# Patient Record
Sex: FEMALE | Race: WHITE | Employment: UNEMPLOYED | URBAN - METROPOLITAN AREA
[De-identification: names, ages, dates, MRNs, and addresses within clinical notes are randomized per-mention and may not be internally consistent; named-entity substitution may affect disease eponyms.]

---

## 2024-06-02 ENCOUNTER — APPOINTMENT (OUTPATIENT)
Dept: CT IMAGING | Facility: HOSPITAL | Age: 49
End: 2024-06-02
Attending: EMERGENCY MEDICINE

## 2024-06-02 ENCOUNTER — HOSPITAL ENCOUNTER (EMERGENCY)
Facility: HOSPITAL | Age: 49
Discharge: HOME | End: 2024-06-02
Attending: EMERGENCY MEDICINE

## 2024-06-02 VITALS
SYSTOLIC BLOOD PRESSURE: 149 MMHG | HEART RATE: 92 BPM | RESPIRATION RATE: 17 BRPM | DIASTOLIC BLOOD PRESSURE: 82 MMHG | OXYGEN SATURATION: 98 % | WEIGHT: 160 LBS | HEIGHT: 64 IN | BODY MASS INDEX: 27.31 KG/M2 | TEMPERATURE: 98 F

## 2024-06-02 DIAGNOSIS — D64.9 ANEMIA, UNSPECIFIED TYPE: ICD-10-CM

## 2024-06-02 DIAGNOSIS — K80.20 CALCULUS OF GALLBLADDER WITHOUT CHOLECYSTITIS WITHOUT OBSTRUCTION: Primary | ICD-10-CM

## 2024-06-02 DIAGNOSIS — M54.2 NECK PAIN: ICD-10-CM

## 2024-06-02 LAB
ALBUMIN SERPL-MCNC: 3.7 G/DL (ref 3.4–5)
ALBUMIN/GLOB SERPL: 0.9 {RATIO} (ref 1–2)
ALP LIVER SERPL-CCNC: 75 U/L
ALT SERPL-CCNC: 18 U/L
ANION GAP SERPL CALC-SCNC: 5 MMOL/L (ref 0–18)
AST SERPL-CCNC: 15 U/L (ref 15–37)
BASOPHILS # BLD AUTO: 0.08 X10(3) UL (ref 0–0.2)
BASOPHILS NFR BLD AUTO: 1.3 %
BILIRUB SERPL-MCNC: 0.2 MG/DL (ref 0.1–2)
BUN BLD-MCNC: 9 MG/DL (ref 9–23)
CALCIUM BLD-MCNC: 8.7 MG/DL (ref 8.5–10.1)
CHLORIDE SERPL-SCNC: 108 MMOL/L (ref 98–112)
CO2 SERPL-SCNC: 26 MMOL/L (ref 21–32)
CREAT BLD-MCNC: 0.67 MG/DL
EGFRCR SERPLBLD CKD-EPI 2021: 108 ML/MIN/1.73M2 (ref 60–?)
EOSINOPHIL # BLD AUTO: 0.12 X10(3) UL (ref 0–0.7)
EOSINOPHIL NFR BLD AUTO: 1.9 %
ERYTHROCYTE [DISTWIDTH] IN BLOOD BY AUTOMATED COUNT: 17.5 %
GLOBULIN PLAS-MCNC: 4.1 G/DL (ref 2.8–4.4)
GLUCOSE BLD-MCNC: 86 MG/DL (ref 70–99)
HCT VFR BLD AUTO: 30.6 %
HGB BLD-MCNC: 9.3 G/DL
IMM GRANULOCYTES # BLD AUTO: 0.02 X10(3) UL (ref 0–1)
IMM GRANULOCYTES NFR BLD: 0.3 %
LIPASE SERPL-CCNC: 40 U/L (ref 13–75)
LYMPHOCYTES # BLD AUTO: 2.65 X10(3) UL (ref 1–4)
LYMPHOCYTES NFR BLD AUTO: 42.3 %
MCH RBC QN AUTO: 20.8 PG (ref 26–34)
MCHC RBC AUTO-ENTMCNC: 30.4 G/DL (ref 31–37)
MCV RBC AUTO: 68.3 FL
MONOCYTES # BLD AUTO: 0.6 X10(3) UL (ref 0.1–1)
MONOCYTES NFR BLD AUTO: 9.6 %
NEUTROPHILS # BLD AUTO: 2.8 X10 (3) UL (ref 1.5–7.7)
NEUTROPHILS # BLD AUTO: 2.8 X10(3) UL (ref 1.5–7.7)
NEUTROPHILS NFR BLD AUTO: 44.6 %
OSMOLALITY SERPL CALC.SUM OF ELEC: 286 MOSM/KG (ref 275–295)
PLATELET # BLD AUTO: 387 10(3)UL (ref 150–450)
POTASSIUM SERPL-SCNC: 3.8 MMOL/L (ref 3.5–5.1)
PROT SERPL-MCNC: 7.8 G/DL (ref 6.4–8.2)
RBC # BLD AUTO: 4.48 X10(6)UL
SODIUM SERPL-SCNC: 139 MMOL/L (ref 136–145)
WBC # BLD AUTO: 6.3 X10(3) UL (ref 4–11)

## 2024-06-02 PROCEDURE — 74177 CT ABD & PELVIS W/CONTRAST: CPT | Performed by: EMERGENCY MEDICINE

## 2024-06-02 PROCEDURE — 36415 COLL VENOUS BLD VENIPUNCTURE: CPT

## 2024-06-02 PROCEDURE — 85025 COMPLETE CBC W/AUTO DIFF WBC: CPT | Performed by: EMERGENCY MEDICINE

## 2024-06-02 PROCEDURE — 83690 ASSAY OF LIPASE: CPT | Performed by: EMERGENCY MEDICINE

## 2024-06-02 PROCEDURE — 80053 COMPREHEN METABOLIC PANEL: CPT | Performed by: EMERGENCY MEDICINE

## 2024-06-02 PROCEDURE — 99285 EMERGENCY DEPT VISIT HI MDM: CPT

## 2024-06-02 PROCEDURE — 99284 EMERGENCY DEPT VISIT MOD MDM: CPT

## 2024-06-02 RX ORDER — ONDANSETRON 2 MG/ML
4 INJECTION INTRAMUSCULAR; INTRAVENOUS ONCE
Status: DISCONTINUED | OUTPATIENT
Start: 2024-06-02 | End: 2024-06-02

## 2024-06-02 RX ORDER — SODIUM CHLORIDE 9 MG/ML
INJECTION, SOLUTION INTRAVENOUS CONTINUOUS
Status: DISCONTINUED | OUTPATIENT
Start: 2024-06-02 | End: 2024-06-02

## 2024-06-02 NOTE — ED QUICK NOTES
PT C/O GENERALIZED SWELLING. SWELLING TO FEET, LEGS, ARMS, HANDS, NECK, AND ABDOMEN. PT STATES FEELS LIKE HER  SCAR IS STRETCHING DUE TO SWELLING. SKIN WARM AND DRY. NO SOB. NO CHEST PAIN. PT STATES DROVE RECENTLY FROM [place] TO [place]. SWELLING HAS BEEN NOTED FOR 1-2 MONTHS.

## 2024-06-03 ENCOUNTER — TELEPHONE (OUTPATIENT)
Dept: CASE MANAGEMENT | Facility: HOSPITAL | Age: 49
End: 2024-06-03

## 2024-06-03 NOTE — CM/SW NOTE
Patient called stating that the follow up listed on her discharge paperwork for GI does not handle the diagnosis of gallstones. Patient given number for the office of the on call general surgeon for the date of her visit, Dr. Hannah Vivar [phone].

## 2024-06-03 NOTE — ED PROVIDER NOTES
Patient Seen in: [facility] Emergency Department      History     Chief Complaint   Patient presents with    Abdomen/Flank Pain    Neck Pain     Stated Complaint: Abd pain, neck pain for 1-2 months    Subjective:   HPI    48-year-old female presents emergency room with chief complaint of abdominal discomfort/bloating.  States over the last month her abdomen has been getting more bloated, states it feels \"uncomfortable \", states she is moving her bowels normally.  Denies vomiting or diarrhea.  Reports she has had a prior gastric sleeve, states there is tenderness along the surgical incision scar.  Patient denies any urinary symptoms.  Denies vaginal bleeding or discharge.  Denies melena or hematochezia.  Denies any fevers or chills.  Patient also reports over the last month she has had intermittent pain to the left side of her neck which will radiate to the shoulder and top of the chest, symptoms occur randomly, do not feel they occur with exertion, when symptoms occur they will last up to 30 minutes at a time, denies associated lightheaded dizzy nausea or diaphoresis, denies any tearing type chest or abdominal pain.  Denies any symptoms at this time to the neck/chest/shoulder.    Objective:   History reviewed. No pertinent past medical history.           Past Surgical History:   Procedure Laterality Date    Gastric bypass,obese<100cm alize-en-y      Hc  section level i                  No pertinent social history.            Review of Systems    Positive for stated complaint: Abd pain, neck pain for 1-2 months  Other systems are as noted in HPI.  Constitutional and vital signs reviewed.      All other systems reviewed and negative except as noted above.    Physical Exam     ED Triage Vitals [24 1829]   /82   Pulse 92   Resp 17   Temp 97.5 °F (36.4 °C)   Temp src Temporal   SpO2 98 %   O2 Device None (Room air)       Current Vitals:   Vital Signs  BP: 149/82  Pulse: 92  Resp: 17  Temp: 97.5  °F (36.4 °C)  Temp src: Temporal    Oxygen Therapy  SpO2: 98 %  O2 Device: None (Room air)            Physical Exam    GENERAL: Patient is awake, alert, well-appearing, in no acute distress.  HEENT: no scleral icterus.  Mucous membranes are moist, oropharynx is clear  NECK: Cervical spine tenderness.  No tenderness to bilateral trapezius.  Neck is supple, there is no nuchal rigidity.  No carotid bruits.  No masses.  Trachea midline.  No cervical lymphadenopathy.  HEART: Regular rate and rhythm, no murmurs.  LUNGS: Clear to auscultation bilaterally.  No Rales, no rhonchi, no wheezing, no stridor.  ABDOMEN: Soft, nondistended, mild diffuse mid abdominal tender, bowel sounds are present, no rebound, no rigidity, no guarding.no pulsatile masses. No CVA tenderness  EXTREMITIES: Tenderness to the bilateral clavicles, shoulder upper arm elbow forearm wrist hand.  No peripheral edema, no calf tenderness  NEUROLOGIC EXAM: Tongue midline, no facial drooping, no ptosis    ED Course     Labs Reviewed   COMP METABOLIC PANEL (14) - Abnormal; Notable for the following components:       Result Value    A/G Ratio 0.9 (*)     All other components within normal limits   CBC W/ DIFFERENTIAL - Abnormal; Notable for the following components:    HGB 9.3 (*)     HCT 30.6 (*)     MCV 68.3 (*)     MCH 20.8 (*)     MCHC 30.4 (*)     All other components within normal limits   LIPASE - Normal   CBC WITH DIFFERENTIAL WITH PLATELET    Narrative:     The following orders were created for panel order CBC With Differential With Platelet.  Procedure                               Abnormality         Status                     ---------                               -----------         ------                     CBC W/ DIFFERENTIAL[[id number]]          Abnormal            Final result                 Please view results for these tests on the individual orders.   URINALYSIS WITH CULTURE REFLEX   RAINBOW DRAW LAVENDER   RAINBOW DRAW LIGHT GREEN    RAINBOW DRAW BLUE                      MDM        Differential diagnosis before testing includes but not limited to bowel obstruction, pancreatitis, cholelithiasis/cholecystitis, which is a medical condition that poses a threat to life/function    Past Medical History/comorbidities-status post gastric bypass    Radiographic images  I personally reviewed the radiographs and my individual interpretation shows CT abdomen, no free air  I also reviewed the official reports that showed cyst, gastric postsurgical changes.  No free fluid.  No pelvic adenopathy.      Course of Events during Emergency Room Visit include blood work obtained.  CBC white count 6.3 hemoglobin 9.3 platelet 387.  Lipase 40.  Chemistry was unremarkable.  Lipase 40.  CT of the abdomen performed, quite phthisis noted, no other acute findings.  I discussed with patient etiology of her symptoms abdominal distention unclear, she does have cholelithiasis, discussed the possibility of this causing some discomfort.  Patient currently denies any discomfort abdomen soft nontender on reevaluation.  Patient also reports intermittent left-sided neck pain which radiates to the left shoulder over the last month currently symptoms free.  Will refer patient to GI and primary care, return to ER if any change or symptoms.  Patient well-appearing agrees plan discharge    Shared decision making was utilized         Discharge  I have discussed with the patient the results of test, differential diagnosis, treatment plan, warning signs and symptoms which should prompt immediate return.  They expressed understanding of these instructions and agrees to the following plan provided.  They were given written discharge instructions and agrees to return for any concerns and voiced understanding and all questions were answered.    Note to patient: The 21st Century Cures Act makes medical notes like these available to patients in the interest of transparency. However, this is a  medical document intended as peer to peer communication. It is written in medical language and may contain abbreviations or verbiage that are unfamiliar. It may appear blunt or direct. Medical documents are intended to carry relevant information, facts as evident, and the clinical opinion of the practitioner.                                            Medical Decision Making      Disposition and Plan     Clinical Impression:  1. Calculus of gallbladder without cholecystitis without obstruction    2. Anemia, unspecified type    3. Neck pain         Disposition:  Discharge  6/2/2024  9:16 pm    Follow-up:  Sandie Sánchez DO  [address] [facility] [address] [phone]    Follow up in 2 day(s)      Michelle Moy DO  [address] [facility] [address]  [facility] [address] [phone]    Follow up in 2 day(s)            Medications Prescribed:  There are no discharge medications for this patient.

## 2024-06-12 ENCOUNTER — OFFICE VISIT (OUTPATIENT)
Facility: LOCATION | Age: 49
End: 2024-06-12

## 2024-06-12 VITALS — TEMPERATURE: 99 F | HEART RATE: 73 BPM | OXYGEN SATURATION: 98 %

## 2024-06-12 DIAGNOSIS — K80.50 BILIARY COLIC: Primary | ICD-10-CM

## 2024-06-12 PROCEDURE — 99205 OFFICE O/P NEW HI 60 MIN: CPT | Performed by: STUDENT IN AN ORGANIZED HEALTH CARE EDUCATION/TRAINING PROGRAM

## 2024-06-12 NOTE — H&P
New Patient Visit Note       Active Problems      No diagnosis found.    Chief Complaint   Chief Complaint   Patient presents with    Establish Care     NP right shoulder pain that radiates down back and into chest for the last 2 months. Severe swelling in the stomach       History of Present Illness   48 year old female who is here for evaluation of cholelithiasis. She presented to the ER with left sided abdominal pain, left chest pain and left neck and shoulder pain that radiates to the left arm. She also reports bloating and abdominal pain that radiates across the upper quadrants. She was evaluated with lab work, EKG and CT scan of the abdomen and pelvis.   She reports having these symptoms for the past 2 months. The pain is most severe in the left shoulder, neck and left arm. She does report some post prandial discomfort in the right upper quadrant that radiates to the left. She also reports nausea at times but denies vomiting.   She has no past medical history. She has surgical history of gastric bypass, c section and abdominoplasty. She has not had a colonoscopy. She denies melena, blood per rectum or unintentional weight loss, constipation or diarrhea.       Allergies  Maria E has No Known Allergies.    Past Medical / Surgical / Social / Family History    The past medical and past surgical history have been reviewed by me today.    History reviewed. No pertinent past medical history.  Past Surgical History:   Procedure Laterality Date    Gastric bypass,obese<100cm alize-en-y      Hc  section level i         The family history and social history have been reviewed by me today.    History reviewed. No pertinent family history.  Social History     Socioeconomic History    Marital status:    Tobacco Use    Smoking status: Never    Smokeless tobacco: Never   Vaping Use    Vaping status: Never Used   Substance and Sexual Activity    Alcohol use: Never    Drug use: Never      No current outpatient  medications on file.      Review of Systems  The Review of Systems has been reviewed by me during today.  Review of Systems    Physical Findings   Pulse 73   Temp 98.5 °F (36.9 °C) (Temporal)   LMP 05/26/2024   SpO2 98%   Physical Exam  Constitutional:       Appearance: Normal appearance.   HENT:      Head: Normocephalic and atraumatic.   Cardiovascular:      Pulses: Normal pulses.   Pulmonary:      Effort: Pulmonary effort is normal.   Abdominal:      General: Abdomen is flat.      Palpations: Abdomen is soft.      Tenderness: There is no abdominal tenderness. There is no guarding or rebound.      Hernia: No hernia is present.      Comments: Right upper quadrant discomfort   Skin:     General: Skin is warm.      Capillary Refill: Capillary refill takes less than 2 seconds.   Neurological:      Mental Status: She is alert and oriented to person, place, and time. Mental status is at baseline.             Assessment/Plan  No diagnosis found.    Maria E ARROYO is a 48 year old female here for evaluation of gallbladder stones. She has left sided shoulder pain and neck pain. This is unlikely to be triggered by gallbladder disease. She does however report right sided abdominal pain at times, with nausea and bloating. These symptoms can be attributed to cholelithiasis. I reviewed her CT scan and her labs. There is a large calculus in the gallbladder infundibulum towards the neck. This can be symptomatic and causing some of her abdominal symptoms.   Of note she is also anemic. In light of this and her left neck symptoms I would like her to see an internist for further workup of these symptoms and anemia. I also would like her to see GI for evaluation for colonoscopy and EGD to address the anemia. I discussed with her the risks and benefits of gallbladder surgery in detail. She understands and agrees to proceed with planned procedure.        Hannah Vivar MD

## 2024-07-02 RX ORDER — FERROUS SULFATE 325(65) MG
325 TABLET, DELAYED RELEASE (ENTERIC COATED) ORAL
COMMUNITY

## 2024-07-08 ENCOUNTER — OFFICE VISIT (OUTPATIENT)
Dept: FAMILY MEDICINE CLINIC | Facility: CLINIC | Age: 49
End: 2024-07-08
Payer: MEDICAID

## 2024-07-08 ENCOUNTER — TELEPHONE (OUTPATIENT)
Dept: FAMILY MEDICINE CLINIC | Facility: CLINIC | Age: 49
End: 2024-07-08

## 2024-07-08 VITALS
SYSTOLIC BLOOD PRESSURE: 116 MMHG | WEIGHT: 187.38 LBS | BODY MASS INDEX: 32 KG/M2 | TEMPERATURE: 98 F | DIASTOLIC BLOOD PRESSURE: 78 MMHG | OXYGEN SATURATION: 98 % | HEART RATE: 80 BPM

## 2024-07-08 DIAGNOSIS — Z01.818 PRE-OP EXAMINATION: Primary | ICD-10-CM

## 2024-07-08 DIAGNOSIS — K80.20 GALLSTONES: ICD-10-CM

## 2024-07-08 PROCEDURE — 99242 OFF/OP CONSLTJ NEW/EST SF 20: CPT | Performed by: FAMILY MEDICINE

## 2024-07-08 NOTE — TELEPHONE ENCOUNTER
Carolann from [facility] calling. States medical clearance is all that is needed for upcoming procedure.  They already have labs    Dr Rodriguez notified

## 2024-07-19 ENCOUNTER — HOSPITAL ENCOUNTER (OUTPATIENT)
Facility: HOSPITAL | Age: 49
Setting detail: HOSPITAL OUTPATIENT SURGERY
Discharge: HOME | End: 2024-07-19
Attending: STUDENT IN AN ORGANIZED HEALTH CARE EDUCATION/TRAINING PROGRAM | Admitting: STUDENT IN AN ORGANIZED HEALTH CARE EDUCATION/TRAINING PROGRAM
Payer: MEDICAID

## 2024-07-19 ENCOUNTER — ANESTHESIA (OUTPATIENT)
Dept: SURGERY | Facility: HOSPITAL | Age: 49
End: 2024-07-19
Payer: MEDICAID

## 2024-07-19 ENCOUNTER — ANESTHESIA EVENT (OUTPATIENT)
Dept: SURGERY | Facility: HOSPITAL | Age: 49
End: 2024-07-19
Payer: MEDICAID

## 2024-07-19 VITALS
BODY MASS INDEX: 32.1 KG/M2 | SYSTOLIC BLOOD PRESSURE: 121 MMHG | TEMPERATURE: 97 F | HEART RATE: 99 BPM | OXYGEN SATURATION: 97 % | WEIGHT: 188 LBS | DIASTOLIC BLOOD PRESSURE: 64 MMHG | RESPIRATION RATE: 20 BRPM | HEIGHT: 64 IN

## 2024-07-19 DIAGNOSIS — K80.50 BILIARY COLIC: ICD-10-CM

## 2024-07-19 DIAGNOSIS — G89.18 POST-OP PAIN: Primary | ICD-10-CM

## 2024-07-19 LAB
B-HCG UR QL: NEGATIVE
ERYTHROCYTE [DISTWIDTH] IN BLOOD BY AUTOMATED COUNT: 19.4 %
HCT VFR BLD AUTO: 30.1 %
HGB BLD-MCNC: 8.9 G/DL
MCH RBC QN AUTO: 21.2 PG (ref 26–34)
MCHC RBC AUTO-ENTMCNC: 29.6 G/DL (ref 31–37)
MCV RBC AUTO: 71.8 FL
PLATELET # BLD AUTO: 285 10(3)UL (ref 150–450)
RBC # BLD AUTO: 4.19 X10(6)UL
WBC # BLD AUTO: 4.7 X10(3) UL (ref 4–11)

## 2024-07-19 PROCEDURE — BF50200 OTHER IMAGING OF BILE DUCTS USING FLUORESCING AGENT, INDOCYANINE GREEN DYE, INTRAOPERATIVE: ICD-10-PCS | Performed by: STUDENT IN AN ORGANIZED HEALTH CARE EDUCATION/TRAINING PROGRAM

## 2024-07-19 PROCEDURE — 81025 URINE PREGNANCY TEST: CPT

## 2024-07-19 PROCEDURE — 0FT44ZZ RESECTION OF GALLBLADDER, PERCUTANEOUS ENDOSCOPIC APPROACH: ICD-10-PCS | Performed by: STUDENT IN AN ORGANIZED HEALTH CARE EDUCATION/TRAINING PROGRAM

## 2024-07-19 PROCEDURE — 85027 COMPLETE CBC AUTOMATED: CPT

## 2024-07-19 PROCEDURE — 8E0W4CZ ROBOTIC ASSISTED PROCEDURE OF TRUNK REGION, PERCUTANEOUS ENDOSCOPIC APPROACH: ICD-10-PCS | Performed by: STUDENT IN AN ORGANIZED HEALTH CARE EDUCATION/TRAINING PROGRAM

## 2024-07-19 RX ORDER — HYDROMORPHONE HYDROCHLORIDE 1 MG/ML
0.4 INJECTION, SOLUTION INTRAMUSCULAR; INTRAVENOUS; SUBCUTANEOUS EVERY 5 MIN PRN
Status: DISCONTINUED | OUTPATIENT
Start: 2024-07-19 | End: 2024-07-19

## 2024-07-19 RX ORDER — ONDANSETRON 2 MG/ML
INJECTION INTRAMUSCULAR; INTRAVENOUS AS NEEDED
Status: DISCONTINUED | OUTPATIENT
Start: 2024-07-19 | End: 2024-07-19 | Stop reason: SURG

## 2024-07-19 RX ORDER — INDOCYANINE GREEN AND WATER 25 MG
5 KIT INJECTION ONCE
Status: COMPLETED | OUTPATIENT
Start: 2024-07-19 | End: 2024-07-19

## 2024-07-19 RX ORDER — PROCHLORPERAZINE EDISYLATE 5 MG/ML
5 INJECTION INTRAMUSCULAR; INTRAVENOUS EVERY 8 HOURS PRN
Status: DISCONTINUED | OUTPATIENT
Start: 2024-07-19 | End: 2024-07-19

## 2024-07-19 RX ORDER — MIDAZOLAM HYDROCHLORIDE 1 MG/ML
INJECTION INTRAMUSCULAR; INTRAVENOUS AS NEEDED
Status: DISCONTINUED | OUTPATIENT
Start: 2024-07-19 | End: 2024-07-19 | Stop reason: SURG

## 2024-07-19 RX ORDER — BUPIVACAINE HYDROCHLORIDE AND EPINEPHRINE 5; 5 MG/ML; UG/ML
INJECTION, SOLUTION EPIDURAL; INTRACAUDAL; PERINEURAL AS NEEDED
Status: DISCONTINUED | OUTPATIENT
Start: 2024-07-19 | End: 2024-07-19 | Stop reason: HOSPADM

## 2024-07-19 RX ORDER — HYDROMORPHONE HYDROCHLORIDE 1 MG/ML
0.2 INJECTION, SOLUTION INTRAMUSCULAR; INTRAVENOUS; SUBCUTANEOUS EVERY 5 MIN PRN
Status: DISCONTINUED | OUTPATIENT
Start: 2024-07-19 | End: 2024-07-19

## 2024-07-19 RX ORDER — SODIUM CHLORIDE, SODIUM LACTATE, POTASSIUM CHLORIDE, CALCIUM CHLORIDE 600; 310; 30; 20 MG/100ML; MG/100ML; MG/100ML; MG/100ML
INJECTION, SOLUTION INTRAVENOUS CONTINUOUS
Status: DISCONTINUED | OUTPATIENT
Start: 2024-07-19 | End: 2024-07-19

## 2024-07-19 RX ORDER — ROCURONIUM BROMIDE 10 MG/ML
INJECTION, SOLUTION INTRAVENOUS AS NEEDED
Status: DISCONTINUED | OUTPATIENT
Start: 2024-07-19 | End: 2024-07-19 | Stop reason: SURG

## 2024-07-19 RX ORDER — ACETAMINOPHEN 500 MG
1000 TABLET ORAL ONCE
Status: DISCONTINUED | OUTPATIENT
Start: 2024-07-19 | End: 2024-07-19 | Stop reason: HOSPADM

## 2024-07-19 RX ORDER — HYDROMORPHONE HYDROCHLORIDE 1 MG/ML
0.6 INJECTION, SOLUTION INTRAMUSCULAR; INTRAVENOUS; SUBCUTANEOUS EVERY 5 MIN PRN
Status: DISCONTINUED | OUTPATIENT
Start: 2024-07-19 | End: 2024-07-19

## 2024-07-19 RX ORDER — HYDROCODONE BITARTRATE AND ACETAMINOPHEN 5; 325 MG/1; MG/1
2 TABLET ORAL ONCE AS NEEDED
Status: COMPLETED | OUTPATIENT
Start: 2024-07-19 | End: 2024-07-19

## 2024-07-19 RX ORDER — HYDROCODONE BITARTRATE AND ACETAMINOPHEN 5; 325 MG/1; MG/1
1 TABLET ORAL ONCE AS NEEDED
Status: COMPLETED | OUTPATIENT
Start: 2024-07-19 | End: 2024-07-19

## 2024-07-19 RX ORDER — KETOROLAC TROMETHAMINE 30 MG/ML
INJECTION, SOLUTION INTRAMUSCULAR; INTRAVENOUS AS NEEDED
Status: DISCONTINUED | OUTPATIENT
Start: 2024-07-19 | End: 2024-07-19 | Stop reason: SURG

## 2024-07-19 RX ORDER — SCOLOPAMINE TRANSDERMAL SYSTEM 1 MG/1
1 PATCH, EXTENDED RELEASE TRANSDERMAL ONCE
Status: DISCONTINUED | OUTPATIENT
Start: 2024-07-19 | End: 2024-07-19 | Stop reason: HOSPADM

## 2024-07-19 RX ORDER — ACETAMINOPHEN 500 MG
1000 TABLET ORAL ONCE AS NEEDED
Status: COMPLETED | OUTPATIENT
Start: 2024-07-19 | End: 2024-07-19

## 2024-07-19 RX ORDER — LIDOCAINE HYDROCHLORIDE 10 MG/ML
INJECTION, SOLUTION EPIDURAL; INFILTRATION; INTRACAUDAL; PERINEURAL AS NEEDED
Status: DISCONTINUED | OUTPATIENT
Start: 2024-07-19 | End: 2024-07-19 | Stop reason: SURG

## 2024-07-19 RX ORDER — DEXAMETHASONE SODIUM PHOSPHATE 4 MG/ML
VIAL (ML) INJECTION AS NEEDED
Status: DISCONTINUED | OUTPATIENT
Start: 2024-07-19 | End: 2024-07-19 | Stop reason: SURG

## 2024-07-19 RX ORDER — ONDANSETRON 2 MG/ML
4 INJECTION INTRAMUSCULAR; INTRAVENOUS EVERY 6 HOURS PRN
Status: DISCONTINUED | OUTPATIENT
Start: 2024-07-19 | End: 2024-07-19

## 2024-07-19 RX ORDER — NALOXONE HYDROCHLORIDE 0.4 MG/ML
0.08 INJECTION, SOLUTION INTRAMUSCULAR; INTRAVENOUS; SUBCUTANEOUS AS NEEDED
Status: DISCONTINUED | OUTPATIENT
Start: 2024-07-19 | End: 2024-07-19

## 2024-07-19 RX ORDER — HEPARIN SODIUM 5000 [USP'U]/ML
5000 INJECTION, SOLUTION INTRAVENOUS; SUBCUTANEOUS ONCE
Status: COMPLETED | OUTPATIENT
Start: 2024-07-19 | End: 2024-07-19

## 2024-07-19 RX ORDER — PHENYLEPHRINE HCL 10 MG/ML
VIAL (ML) INJECTION AS NEEDED
Status: DISCONTINUED | OUTPATIENT
Start: 2024-07-19 | End: 2024-07-19 | Stop reason: SURG

## 2024-07-19 RX ORDER — OXYCODONE HYDROCHLORIDE 5 MG/1
5 TABLET ORAL EVERY 6 HOURS PRN
Qty: 15 TABLET | Refills: 0 | Status: SHIPPED | OUTPATIENT
Start: 2024-07-19

## 2024-07-19 RX ADMIN — SODIUM CHLORIDE, SODIUM LACTATE, POTASSIUM CHLORIDE, CALCIUM CHLORIDE: 600; 310; 30; 20 INJECTION, SOLUTION INTRAVENOUS at 12:08:00

## 2024-07-19 RX ADMIN — LIDOCAINE HYDROCHLORIDE 50 MG: 10 INJECTION, SOLUTION EPIDURAL; INFILTRATION; INTRACAUDAL; PERINEURAL at 12:14:00

## 2024-07-19 RX ADMIN — ROCURONIUM BROMIDE 10 MG: 10 INJECTION, SOLUTION INTRAVENOUS at 12:55:00

## 2024-07-19 RX ADMIN — KETOROLAC TROMETHAMINE 30 MG: 30 INJECTION, SOLUTION INTRAMUSCULAR; INTRAVENOUS at 13:08:00

## 2024-07-19 RX ADMIN — ONDANSETRON 4 MG: 2 INJECTION INTRAMUSCULAR; INTRAVENOUS at 13:10:00

## 2024-07-19 RX ADMIN — PHENYLEPHRINE HCL 50 MCG: 10 MG/ML VIAL (ML) INJECTION at 12:31:00

## 2024-07-19 RX ADMIN — MIDAZOLAM HYDROCHLORIDE 2 MG: 1 INJECTION INTRAMUSCULAR; INTRAVENOUS at 12:10:00

## 2024-07-19 RX ADMIN — ROCURONIUM BROMIDE 40 MG: 10 INJECTION, SOLUTION INTRAVENOUS at 12:14:00

## 2024-07-19 RX ADMIN — DEXAMETHASONE SODIUM PHOSPHATE 8 MG: 4 MG/ML VIAL (ML) INJECTION at 12:20:00

## 2024-07-19 RX ADMIN — PHENYLEPHRINE HCL 100 MCG: 10 MG/ML VIAL (ML) INJECTION at 12:43:00

## 2024-07-19 NOTE — ANESTHESIA PROCEDURE NOTES
Airway  Date/Time: 7/19/2024 12:17 PM  Urgency: elective      General Information and Staff    Patient location during procedure: OR  Anesthesiologist: Domenico Christian MD  Performed: anesthesiologist   Performed by: Domenico Christian MD  Authorized by: Domenico Christian MD      Indications and Patient Condition  Indications for airway management: anesthesia  Spontaneous Ventilation: absent  Sedation level: deep  Preoxygenated: yes  Patient position: sniffing  MILS not maintained throughout  Mask difficulty assessment: 0 - not attempted    Final Airway Details  Final airway type: endotracheal airway      Successful airway: ETT  Cuffed: yes   Successful intubation technique: direct laryngoscopy  Facilitating devices/methods: intubating stylet  Endotracheal tube insertion site: oral  Blade: Harini  Blade size: #3  ETT size (mm): 7.0    Placement verified by: capnometry   Cuff volume (mL): 6  Measured from: lips  ETT to lips (cm): 21  Number of attempts at approach: 1  Number of other approaches attempted: 0

## 2024-07-19 NOTE — ANESTHESIA POSTPROCEDURE EVALUATION
[facility]    Maria E Mccain Patient Status:  Hospital Outpatient Surgery   Age/Gender 48 year old female MRN [id number]   Location [facility] POST ANESTHESIA CARE UNIT Attending Hannah Vivar MD   Hosp Day # 0 PCP Kang Hernandez MD       Anesthesia Post-op Note    ROBOTIC LAPAROSCOPIC CHOLECYSTECTOMY WITH INJECTION OF INDOCYANINE GREEN    Procedure Summary       Date: 07/19/24 Room / Location:  MAIN OR 08 /  MAIN OR    Anesthesia Start: 1208 Anesthesia Stop: 1329    Procedure: ROBOTIC LAPAROSCOPIC CHOLECYSTECTOMY WITH INJECTION OF INDOCYANINE GREEN (Abdomen) Diagnosis:       Biliary colic      (Biliary colic [K80.50])    Surgeons: Hannah Vivar MD Anesthesiologist: Domenico Christian MD    Anesthesia Type: general ASA Status: 2            Anesthesia Type: general    Vitals Value Taken Time   /73 07/19/24 1328   Temp 98.7 07/19/24 1332   Pulse 90 07/19/24 1332   Resp 21 07/19/24 1332   SpO2 98 % 07/19/24 1332   Vitals shown include unfiled device data.    Patient Location: PACU    Anesthesia Type: general    Airway Patency: patent    Postop Pain Control: adequate    Mental Status: mildly sedated but able to meaningfully participate in the post-anesthesia evaluation    Nausea/Vomiting: none    Cardiopulmonary/Hydration status: stable euvolemic    Complications: no apparent anesthesia related complications    Postop vital signs: stable    Dental Exam: Unchanged from Preop    Patient to be discharged from PACU when criteria met.

## 2024-07-19 NOTE — ANESTHESIA PREPROCEDURE EVALUATION
PRE-OP EVALUATION    Patient Name: Maria E Mccain    Admit Diagnosis: Biliary colic [K80.50]    Pre-op Diagnosis: Biliary colic [K80.50]    ROBOTIC LAPAROSCOPIC CHOLECYSTECTOMY POSSIBLE OPEN WITH INJECTION OF INDOCYANINE GREEN    Anesthesia Procedure: ROBOTIC LAPAROSCOPIC CHOLECYSTECTOMY POSSIBLE OPEN WITH INJECTION OF INDOCYANINE GREEN    Surgeons and Role:     * Hannah Vivar MD - Primary    Pre-op vitals reviewed.  Temp: 98 °F (36.7 °C)  Pulse: 78  Resp: 16  BP: 114/67  SpO2: 100 %  Body mass index is 32.27 kg/m².    Current medications reviewed.  Hospital Medications:  • [Transfer Hold] acetaminophen (Tylenol Extra Strength) tab 1,000 mg  1,000 mg Oral Once   • [Transfer Hold] scopolamine (Transderm-Scop) 1 MG/3DAYS patch 1 patch  1 patch Transdermal Once   • lactated ringers infusion   Intravenous Continuous   • [COMPLETED] heparin (Porcine) 5000 UNIT/ML injection 5,000 Units  5,000 Units Subcutaneous Once   • [COMPLETED] indocyanine green (IC-Green) injection 5 mg  5 mg Intravenous Once   • ceFAZolin (Ancef) 2g in 10mL IV syringe premix  2 g Intravenous Once       Outpatient Medications:     Medications Prior to Admission   Medication Sig Dispense Refill Last Dose   • ferrous sulfate 325 (65 FE) MG Oral Tab EC Take 1 tablet (325 mg total) by mouth daily with breakfast.   7/12/2024       Allergies: Patient has no known allergies.      Anesthesia Evaluation    Patient summary reviewed.    Anesthetic Complications  (-) history of anesthetic complications         GI/Hepatic/Renal    Negative GI/hepatic/renal ROS.                             Cardiovascular    Negative cardiovascular ROS.    Exercise tolerance: good     MET: >4    (+) obesity                         (-) angina     (-) ONEILL         Endo/Other               (+) anemia                   Pulmonary    Negative pulmonary ROS.                (-) sleep apnea       Neuro/Psych    Negative neuro/psych ROS.                                Past Surgical  History:   Procedure Laterality Date   • Gastric bypass,obese<100cm alize-en-y     • Hc  section level i       Social History     Socioeconomic History   • Marital status:    Tobacco Use   • Smoking status: Never   • Smokeless tobacco: Never   Vaping Use   • Vaping status: Never Used   Substance and Sexual Activity   • Alcohol use: Never   • Drug use: Never     History   Drug Use Unknown     Available pre-op labs reviewed.  Lab Results   Component Value Date    WBC 4.7 2024    RBC 4.19 2024    HGB 8.9 (L) 2024    HCT 30.1 (L) 2024    MCV 71.8 (L) 2024    MCH 21.2 (L) 2024    MCHC 29.6 (L) 2024    RDW 19.4 2024    .0 2024     Lab Results   Component Value Date     2024    K 3.8 2024     2024    CO2 26.0 2024    BUN 9 2024    CREATSERUM 0.67 2024    GLU 86 2024    CA 8.7 2024            Airway      Mallampati: II  Mouth opening: >3 FB  TM distance: > 6 cm  Neck ROM: full Cardiovascular    Cardiovascular exam normal.         Dental    Dentition appears grossly intact         Pulmonary    Pulmonary exam normal.                 Other findings        ASA: 2   Plan: general  NPO status verified and patient meets guidelines.          Plan/risks discussed with: patient and spouse  Use of blood product(s) discussed with: patient and spouse    Consented to blood products.      Present on Admission:  **None**

## 2024-07-19 NOTE — OPERATIVE REPORT
[facility] [place]  Operative Note    Maria E Mccain Location: OR   CSN [id number] MRN [id number]    1975 Age 48 year old   Admission Date 2024 Operation Date 2024   Attending Physician Hannah Vivar MD Operating Physician Hannah Vivar MD   PCP Kang Hernandez MD        Patient Name: Maria E Mccain    Preoperative Diagnosis: Biliary colic [K80.50]    Postoperative Diagnosis: Same as pre-op diagnosis.    Primary Surgeon: Hannah Vivar MD     Assistant: Lalitha Seymour PA-C    The assistance of Lalitha Seymour PA-C was absolutely essential to the proper conduct of this case and further assisted with da [employer] robot bedside assist.       Anesthesia: General    Anesthesiologist: Anesthesiologist.: Domenico Christian MD    Procedures: Robot-assisted Laparoscopic Cholecystectomy     Implants: None    Specimen: Gallbladder    Drains: None    Estimated Blood Loss: Blood Output: 5 mL (2024  1:07 PM)       Complications: none    Condition: Good    Indications for Surgery:   Maria E Mccain is a 48 year old female who presents with progressive epigastric and right upper quadrant abdominal pain. Work-up revealed cholelithiasis. The patient presents today for laparoscopic cholecystectomy.    Surgical Findings:   Cholelithiasis      Description of Procedure:   The patient was transported to the operating room and placed on the operating table in supine position.General endotracheal anesthesia was administered. Preoperative antibiotics were given. The abdomen was prepped and draped in sterile fashion. A time-out was performed.    An 8 mm incision was made in the left upper quadrant . The Veress needle was introduced into the abdominal cavity and pneumoperitoneum was achieved to a pressure of 15 mmHg.      An 8 mm trocar was placed through this incision. Upon initial inspection of the abdominal cavity there was no injury from our entry. There was no unexpected abnormality of the visible abdominal  organs.  Three additional 8 mm trochars were placed in the mid right abdomen, right flank and umbilicus. Patient was placed in reverse Trendelenburg position and right side up.  The robot was docked. Instruments were placed under direct visualization.  Attention was turned to the right upper quadrant. The gallbladder dome was grasped and elevated,. The infundibulum was retracted. Indocyanine green had been injected preoperatively and firefly was used to confirm the location of the cystic duct and common bile duct. Dissection was initiated at the triangle of Calot.  After clearing the peritoneum and connective tissue the cystic duct and cystic artery were identified superior to the line of Rouviere. The infundibulum was dissected away from the liver bed. The critical view of safety was obtained.  Next, a clip was placed on the specimen side of the cystic duct and two clips were placed on the patient's side of the cystic duct. The duct was divided. Next, one clip was placed on the cystic artery on the specimen side, two towards the patient side, and the cystic artery was divided with the Endoshears. The gallbladder was elevated from the gallbladder fossa using electrocautery. Once the gallbladder was dissected from the gallbladder fossa it was placed in an endocatch specimen bag and set aside.   Hemostasis on the gallbladder fossa was achieved with electrocautery. The right upper quadrant was then irrigated until the effluent fluid was clear.  The previously placed clips on the cystic duct and cystic artery were visualized and inspected; they were well approximated, well situated, and there was no evidence of active bleeding or bile leak. The specimen was then extracted from the umbilical trocar site.  Pneumoperitoneum was released and trocars removed. The fascia at the umbilicus was reapproximated using #1 PDS suture. All skin incisions were cleansed, irrigated, and injected with local anesthetic. Skin incisions were  reapproximated using subcuticular 4-0 monocryl suture.  Skin glue was used to seal all the incisions.  The patient was awakened from anesthesia and brought to the recovery room in good condition. The patient tolerated the procedure well without apparent complication. All sponge, needle, and instrument counts were correct at the end of the case.  Hannah Vivar MD   7/19/2024  1:12 PM

## 2024-07-19 NOTE — DISCHARGE INSTRUCTIONS
Home Care Instructions  Cholecystectomy  Hannah Vivar MD      WHAT TO EXPECT  You may feel pain at the incisions. This is due to stitches placed during the surgery.    You may feel pain in the shoulders. This is due to irritation of the diaphragm by the air used to inflate the abdomen.    You may feel a sore throat. This is due to the breathing tube used during surgery.     You may feel mild nausea and vomiting for the first 24 hours, this should resolve quickly.    You may have constipation, especially if taking narcotic pain medications. If you have not had a bowel movement by 48 hours after surgery, take Miralax 17g (one cap full) every 12 hours until you have a bowel movement. If another 24 hours goes by without a bowel movement, then take a dose of magnesium citrate or milk of magnesia.     MEDICATIONS  Take 2 Extra Strength Tylenol (1000mg every) 8 hours for pain. For the first 3 days it is best to take the Tylenol every 8 hours even if you do not feel much pain.     For moderate to severe pain take one Oxycodone pill (5mg) or Norco (325/5mg) every six hours as needed for pain. If you do not feel that narcotics are necessary you shouldn’t take them. If the pain is severe you can take two pills (10mg) every six hours.    You can take Advil (ibuprofen) 800mg every 8 hours or Alleve (naproxen) 500mg every 12 hours.     Please ask your surgeon before resuming blood thinners such as Aspirin, Plavix, Coumadin, Warfarin, Eliquis, or Xarelto. All other home medications may be resumed as scheduled.    Norco contains acetaminophen which is the active ingredient in Tylenol. Do not exceed the recommended dose of 4000mg of Acetaminophen within 24 hours from all sources (norco and tylenol).    DIET  Start with a light and bland diet and slowly advance to regular food as your appetite improves. There are no specific food restrictions. Do not eat excessively. Eat small frequent meals.     Drink plenty of water. Try to eat  a healthy high fiber diet.    Do not drink alcohol (beer, wine, liquor) or use tobacco products.    WOUND CARE  You can shower 24 hours after surgery and get the dressings wet.    The skin glue will stay on for 10 to 14 days after surgery.     Soap and water can get on the incisions but do not scrub the wounds. No hair dye or chemicals of any kind should get on the incisions.     Do not apply any topical ointments such as Neosporin or Hydrogen Peroxide.    Do not swim or submerge the incisions under water for 1 month.    ACTIVITY  Every day you should be up walking around the house. Do not lie in bed all day. Staying active prevents blood clots and pneumonia.    You can go up and down stairs. Do not lift more than 20 pounds or perform strenuous activity that requires straining the core muscles.    You may ride in a car but should not drive the car for at least one week.     APPOINTMENT  Please call our office at [phone] soon to make an appointment.    For questions or concerns please call our office between 8:30 a.m. and 5 p.m. Monday through Friday. The number above directs to the answering service after hours to reach the on-call physician.    Please call our office immediately for fever greater than 100.5, excess bleeding, inability to urinate, severe abdominal pain, severe diarrhea, uncontrollable vomiting.      For life threatening emergencies such as severe chest pain, difficulty breathing, or loss of conciousness call 911.   You received a drug called Toradol which is an Anti Inflammatory at:1:08pm  If you are allowed to take Anti inflammatories:    Do not take any Anti Inflammatory like Motrin, Aleve or Ibuprophen until after: 7:08pm  Please report any suspected allergic reactions or bleeding issues to your doctor     NORCO WAS GIVEN AT 3:45pm next pain medication OR tylenol dose at 9:45pm

## 2024-07-19 NOTE — H&P
New Patient Visit Note       Active Problems      No diagnosis found.    Chief Complaint   No chief complaint on file.      History of Present Illness   48 year old female who is here for evaluation of cholelithiasis. She presented to the ER with left sided abdominal pain, left chest pain and left neck and shoulder pain that radiates to the left arm. She also reports bloating and abdominal pain that radiates across the upper quadrants. She was evaluated with lab work, EKG and CT scan of the abdomen and pelvis.   She reports having these symptoms for the past 2 months. The pain is most severe in the left shoulder, neck and left arm. She does report some post prandial discomfort in the right upper quadrant that radiates to the left. She also reports nausea at times but denies vomiting.   She has no past medical history. She has surgical history of gastric bypass, c section and abdominoplasty. She has not had a colonoscopy. She denies melena, blood per rectum or unintentional weight loss, constipation or diarrhea.       Allergies  Maria E has No Known Allergies.    Past Medical / Surgical / Social / Family History    The past medical and past surgical history have been reviewed by me today.    History reviewed. No pertinent past medical history.  Past Surgical History:   Procedure Laterality Date    Gastric bypass,obese<100cm alize-en-y      Hc  section level i         The family history and social history have been reviewed by me today.    History reviewed. No pertinent family history.  Social History     Socioeconomic History    Marital status:    Tobacco Use    Smoking status: Never    Smokeless tobacco: Never   Vaping Use    Vaping status: Never Used   Substance and Sexual Activity    Alcohol use: Never    Drug use: Never      No current outpatient medications on file.      Review of Systems  The Review of Systems has been reviewed by me during today.  Review of Systems    Physical Findings     Physical  Exam  Constitutional:       Appearance: Normal appearance.   HENT:      Head: Normocephalic and atraumatic.   Cardiovascular:      Pulses: Normal pulses.   Pulmonary:      Effort: Pulmonary effort is normal.   Abdominal:      General: Abdomen is flat.      Palpations: Abdomen is soft.      Tenderness: There is no abdominal tenderness. There is no guarding or rebound.      Hernia: No hernia is present.      Comments: Right upper quadrant discomfort   Skin:     General: Skin is warm.      Capillary Refill: Capillary refill takes less than 2 seconds.   Neurological:      Mental Status: She is alert and oriented to person, place, and time. Mental status is at baseline.             Assessment/Plan  No diagnosis found.    Maria E Mccain is a 48 year old female here for evaluation of gallbladder stones. She has left sided shoulder pain and neck pain. This is unlikely to be triggered by gallbladder disease. She does however report right sided abdominal pain at times, with nausea and bloating. These symptoms can be attributed to cholelithiasis. I reviewed her CT scan and her labs. There is a large calculus in the gallbladder infundibulum towards the neck. This can be symptomatic and causing some of her abdominal symptoms.   She is cleared from a medical standpoint.        Hannah Vivar MD

## 2024-08-28 ENCOUNTER — OFFICE VISIT (OUTPATIENT)
Facility: LOCATION | Age: 49
End: 2024-08-28
Payer: MEDICAID

## 2024-08-28 VITALS — HEART RATE: 69 BPM | TEMPERATURE: 98 F

## 2024-08-28 DIAGNOSIS — Z98.890 POST-OPERATIVE STATE: Primary | ICD-10-CM

## 2024-08-28 PROCEDURE — 99024 POSTOP FOLLOW-UP VISIT: CPT | Performed by: STUDENT IN AN ORGANIZED HEALTH CARE EDUCATION/TRAINING PROGRAM

## 2024-09-04 NOTE — PROGRESS NOTES
Post Operative Visit Note       Active Problems  No diagnosis found.     Chief Complaint   Chief Complaint   Patient presents with    Post-Op     PO  ROBOTIC LAPAROSCOPIC CHOLECYSTECTOMY WITH INJECTION OF INDOCYANINE GREEN- reports a hard lump around umbilicus           History of Present Illness   49 year old female who is status post robotic cholecystectomy on  presents for follow up.   She feels well. Reports minimal pain at the incisions. No fevers, chills, nausea or vomiting or any other symptoms.       Allergies  Maria E has No Known Allergies.    Past Medical / Surgical / Social / Family History    The past medical and past surgical history have been reviewed by me today.     History reviewed. No pertinent past medical history.  Past Surgical History:   Procedure Laterality Date    Cholecystectomy  2024    ROBOTIC LAPAROSCOPIC CHOLECYSTECTOMY WITH INJECTION OF INDOCYANINE GREEN    Gastric bypass,obese<100cm alize-en-y      Hc  section level i         The family history and social history have been reviewed by me today.    History reviewed. No pertinent family history.  Social History     Socioeconomic History    Marital status:    Tobacco Use    Smoking status: Never    Smokeless tobacco: Never   Vaping Use    Vaping status: Never Used   Substance and Sexual Activity    Alcohol use: Never    Drug use: Never        Current Outpatient Medications:     oxyCODONE 5 MG Oral Tab, Take 1 tablet (5 mg total) by mouth every 6 (six) hours as needed. (Patient not taking: Reported on 2024), Disp: 15 tablet, Rfl: 0    ferrous sulfate 325 (65 FE) MG Oral Tab EC, Take 1 tablet (325 mg total) by mouth daily with breakfast., Disp: , Rfl:       Review of Systems  A 10 point Review of Systems has been completed by me today and is negative except as above in the HPI.    Physical Findings   Pulse 69   Temp 98 °F (36.7 °C) (Temporal)   LMP 2024   Gen/psych: alert and oriented, cooperative, no  apparent distress  Cardiovascular: regular rate  Respiratory: respirations unlabored, no wheeze  Abdominal: soft, non-tender, non-distended, no guarding/rebound, robotic incisions are clean dry and intact, no erythema         Assessment/Plan  No diagnosis found.    49 year old female who is status post robotic cholecystectomy on 7/19 presents for follow up.  Doing well from surgery. No post operative issues. Pathology reviewed and no signs of malignancy. I have no further follow-up scheduled with this patient at this time.  This patient can see me on an as-needed basis.  This patient should return urgently for any problems or complications related to my surgical intervention.         Follow Up  No follow-ups on file.    Hannah Vivar MD  [facility]

## 2025-04-03 ENCOUNTER — PATIENT OUTREACH (OUTPATIENT)
Dept: FAMILY MEDICINE CLINIC | Facility: CLINIC | Age: 50
End: 2025-04-03

## 2025-06-30 NOTE — H&P
HPI:   Maria E Mccain is a 48 year old female who presents for a pre-operative physical exam. Patient is to have Robotic  Lap. Cholecystectomy, to be done by Dr. Hannah Vivar at  on 24.      Pt complains of abdominal pain.    Revised Cardiac Risk Index (modified Aguiar Index):   High-risk surgical procedure (intraperitoneal, intrathoracic, suprainguinal vascular) Yes   History of MI or positive stress test, current chest pain secondary to myocardiac ischemia, current nitrate therapy, or EKG with pathologic Q wave No   History of CHF, pulmonary edema, PND, current rales, S3 gallop, chest xray with pulmonary vascular redistribution No   History of CVA/TIA No   Preoperative treatment with insulin No   Preoperative creatinine > 2.0 No   Score 1     Risk of major cardiac complication: low    Patient reports no:  Chest pain  Chest discomfort  Jaw pain  Jaw discomfort  Arm pain   Arm discomfort  Shortness of breath   Dyspnea on exertion  Paroxysmal nocturnal dyspnea  Orthopnea  Palpations  Edema    Previous hx of cardiac or vascular issues are none    Current Outpatient Medications   Medication Sig Dispense Refill    ferrous sulfate 325 (65 FE) MG Oral Tab EC Take 1 tablet (325 mg total) by mouth daily with breakfast.        Allergies: No Known Allergies   History reviewed. No pertinent past medical history.   Past Surgical History:   Procedure Laterality Date    Gastric bypass,obese<100cm alize-en-y      Hc  section level i        History reviewed. No pertinent family history.   Social History:   Social History     Socioeconomic History    Marital status:    Tobacco Use    Smoking status: Never    Smokeless tobacco: Never   Vaping Use    Vaping status: Never Used   Substance and Sexual Activity    Alcohol use: Never    Drug use: Never          REVIEW OF SYSTEMS:   GENERAL: feels well otherwise  SKIN: denies any unusual skin lesions  EYES: denies blurred vision or double vision  HEENT: denies URI  symptoms  LUNGS: see HPI  CARDIOVASCULAR: see HPI  GI: denies diarrhea  : no urinary symptoms  MUSCULOSKELETAL: good rom  PSYCHE: denies depression or anxiety  HEMATOLOGIC: denies hx of anemia  ENDOCRINE: denies thyroid history  ALL/ASTHMA: denies hx of allergy or asthma    EXAM:   /78   Pulse 80   Temp 98.4 °F (36.9 °C) (Temporal)   Wt 187 lb 6.4 oz (85 kg)   LMP 07/01/2024   SpO2 98%   BMI 32.17 kg/m²   GENERAL: well developed, well nourished, in no apparent distress  SKIN: no rashes, no suspicious lesions  HEENT: atraumatic, normocephalic, ears and throat are clear  EYES: PERRL, EOMI, normal conjunctiva, anicteric  NECK: supple, no adenopathy, no bruits  LUNGS: clear to auscultation  CARDIO: RRR without murmur  GI: good BS's, no masses, HSM or tenderness  : deferred  MUSCULOSKELETAL: good rom  EXTREMITIES: no cyanosis, clubbing or edema  NEURO: A &O x 3, CN intact, motor and sensory are grossly intact    ASSESSMENT AND PLAN:     Maria E Mccain is a 48 year old female who presents for a pre-operative physical exam. Patient is to have Robotic Lap, Cholestectomy, to be done by Dr. Vivar at  on 7/19/24. Pt has the following conditions: gallstones. Pt has no significant history of cardiac or pulmonary conditions. Patient is cleared for surgery with the risks of the procedure and anesthesia as discussed with those specific specialists.    This consult was sent back the referring physician, Dr. Vivar.       5 (moderate pain)

## 2025-07-08 ENCOUNTER — PATIENT OUTREACH (OUTPATIENT)
Dept: FAMILY MEDICINE CLINIC | Facility: CLINIC | Age: 50
End: 2025-07-08

## 2025-08-07 ENCOUNTER — OFFICE VISIT (OUTPATIENT)
Dept: FAMILY MEDICINE CLINIC | Facility: CLINIC | Age: 50
End: 2025-08-07

## 2025-08-07 VITALS
BODY MASS INDEX: 31.92 KG/M2 | OXYGEN SATURATION: 97 % | HEART RATE: 74 BPM | TEMPERATURE: 96 F | RESPIRATION RATE: 16 BRPM | HEIGHT: 64 IN | DIASTOLIC BLOOD PRESSURE: 66 MMHG | WEIGHT: 187 LBS | SYSTOLIC BLOOD PRESSURE: 102 MMHG

## 2025-08-07 DIAGNOSIS — Z13.220 LIPID SCREENING: ICD-10-CM

## 2025-08-07 DIAGNOSIS — Z00.00 WELLNESS EXAMINATION: Primary | ICD-10-CM

## 2025-08-07 DIAGNOSIS — E66.9 OBESITY (BMI 30-39.9): ICD-10-CM

## 2025-08-07 DIAGNOSIS — Z12.31 ENCOUNTER FOR SCREENING MAMMOGRAM FOR MALIGNANT NEOPLASM OF BREAST: ICD-10-CM

## 2025-08-07 DIAGNOSIS — H53.8 BLURRY VISION: ICD-10-CM

## 2025-08-07 DIAGNOSIS — Z12.11 COLON CANCER SCREENING: ICD-10-CM

## 2025-08-07 PROCEDURE — 99396 PREV VISIT EST AGE 40-64: CPT | Performed by: FAMILY MEDICINE

## 2025-08-08 ENCOUNTER — LAB ENCOUNTER (OUTPATIENT)
Dept: LAB | Age: 50
End: 2025-08-08
Attending: FAMILY MEDICINE

## 2025-08-08 ENCOUNTER — TELEPHONE (OUTPATIENT)
Dept: FAMILY MEDICINE CLINIC | Facility: CLINIC | Age: 50
End: 2025-08-08

## 2025-08-08 DIAGNOSIS — Z00.00 WELLNESS EXAMINATION: ICD-10-CM

## 2025-08-08 DIAGNOSIS — D64.9 ANEMIA, UNSPECIFIED TYPE: Primary | ICD-10-CM

## 2025-08-08 DIAGNOSIS — Z13.220 LIPID SCREENING: ICD-10-CM

## 2025-08-08 DIAGNOSIS — E66.9 OBESITY (BMI 30-39.9): ICD-10-CM

## 2025-08-08 LAB
ALBUMIN SERPL-MCNC: 4.4 G/DL (ref 3.2–4.8)
ALBUMIN/GLOB SERPL: 1.6 (ref 1–2)
ALP LIVER SERPL-CCNC: 64 U/L (ref 39–100)
ALT SERPL-CCNC: 14 U/L (ref 10–49)
ANION GAP SERPL CALC-SCNC: 8 MMOL/L (ref 0–18)
AST SERPL-CCNC: 18 U/L (ref ?–34)
BASOPHILS # BLD AUTO: 0.07 X10(3) UL (ref 0–0.2)
BASOPHILS NFR BLD AUTO: 1.4 %
BILIRUB SERPL-MCNC: 0.4 MG/DL (ref 0.3–1.2)
BUN BLD-MCNC: 10 MG/DL (ref 9–23)
CALCIUM BLD-MCNC: 9.1 MG/DL (ref 8.7–10.6)
CHLORIDE SERPL-SCNC: 106 MMOL/L (ref 98–112)
CHOLEST SERPL-MCNC: 155 MG/DL (ref ?–200)
CO2 SERPL-SCNC: 27 MMOL/L (ref 21–32)
CREAT BLD-MCNC: 0.78 MG/DL (ref 0.55–1.02)
EGFRCR SERPLBLD CKD-EPI 2021: 93 ML/MIN/1.73M2 (ref 60–?)
EOSINOPHIL # BLD AUTO: 0.14 X10(3) UL (ref 0–0.7)
EOSINOPHIL NFR BLD AUTO: 2.7 %
ERYTHROCYTE [DISTWIDTH] IN BLOOD BY AUTOMATED COUNT: 17.3 %
FASTING PATIENT LIPID ANSWER: YES
FASTING STATUS PATIENT QL REPORTED: YES
GLOBULIN PLAS-MCNC: 2.7 G/DL (ref 2–3.5)
GLUCOSE BLD-MCNC: 94 MG/DL (ref 70–99)
HCT VFR BLD AUTO: 32.8 % (ref 35–48)
HDLC SERPL-MCNC: 47 MG/DL (ref 40–59)
HGB BLD-MCNC: 9.5 G/DL (ref 12–16)
IMM GRANULOCYTES # BLD AUTO: 0.01 X10(3) UL (ref 0–1)
IMM GRANULOCYTES NFR BLD: 0.2 %
LDLC SERPL CALC-MCNC: 94 MG/DL (ref ?–100)
LYMPHOCYTES # BLD AUTO: 2.01 X10(3) UL (ref 1–4)
LYMPHOCYTES NFR BLD AUTO: 39.3 %
MCH RBC QN AUTO: 21.4 PG (ref 26–34)
MCHC RBC AUTO-ENTMCNC: 29 G/DL (ref 31–37)
MCV RBC AUTO: 73.9 FL (ref 80–100)
MONOCYTES # BLD AUTO: 0.38 X10(3) UL (ref 0.1–1)
MONOCYTES NFR BLD AUTO: 7.4 %
NEUTROPHILS # BLD AUTO: 2.51 X10 (3) UL (ref 1.5–7.7)
NEUTROPHILS # BLD AUTO: 2.51 X10(3) UL (ref 1.5–7.7)
NEUTROPHILS NFR BLD AUTO: 49 %
NONHDLC SERPL-MCNC: 108 MG/DL (ref ?–130)
OSMOLALITY SERPL CALC.SUM OF ELEC: 291 MOSM/KG (ref 275–295)
PLATELET # BLD AUTO: 358 10(3)UL (ref 150–450)
POTASSIUM SERPL-SCNC: 4.6 MMOL/L (ref 3.5–5.1)
PROT SERPL-MCNC: 7.1 G/DL (ref 5.7–8.2)
RBC # BLD AUTO: 4.44 X10(6)UL (ref 3.8–5.3)
SODIUM SERPL-SCNC: 141 MMOL/L (ref 136–145)
TRIGL SERPL-MCNC: 70 MG/DL (ref 30–149)
TSI SER-ACNC: 0.74 UIU/ML (ref 0.55–4.78)
VLDLC SERPL CALC-MCNC: 11 MG/DL (ref 0–30)
WBC # BLD AUTO: 5.1 X10(3) UL (ref 4–11)

## 2025-08-08 PROCEDURE — 36415 COLL VENOUS BLD VENIPUNCTURE: CPT

## 2025-08-08 PROCEDURE — 80053 COMPREHEN METABOLIC PANEL: CPT

## 2025-08-08 PROCEDURE — 80061 LIPID PANEL: CPT

## 2025-08-08 PROCEDURE — 84443 ASSAY THYROID STIM HORMONE: CPT

## 2025-08-08 PROCEDURE — 85025 COMPLETE CBC W/AUTO DIFF WBC: CPT

## 2025-08-12 ENCOUNTER — HOSPITAL ENCOUNTER (OUTPATIENT)
Dept: MAMMOGRAPHY | Facility: HOSPITAL | Age: 50
Discharge: HOME | End: 2025-08-12
Attending: FAMILY MEDICINE

## 2025-08-12 DIAGNOSIS — Z12.31 ENCOUNTER FOR SCREENING MAMMOGRAM FOR MALIGNANT NEOPLASM OF BREAST: ICD-10-CM

## 2025-08-12 PROCEDURE — 77063 BREAST TOMOSYNTHESIS BI: CPT | Performed by: FAMILY MEDICINE

## 2025-08-12 PROCEDURE — 77067 SCR MAMMO BI INCL CAD: CPT | Performed by: FAMILY MEDICINE

## 2025-08-25 ENCOUNTER — OFFICE VISIT (OUTPATIENT)
Facility: LOCATION | Age: 50
End: 2025-08-25
Attending: STUDENT IN AN ORGANIZED HEALTH CARE EDUCATION/TRAINING PROGRAM

## 2025-08-25 ENCOUNTER — TELEPHONE (OUTPATIENT)
Facility: LOCATION | Age: 50
End: 2025-08-25

## 2025-08-25 VITALS
WEIGHT: 187 LBS | BODY MASS INDEX: 32 KG/M2 | OXYGEN SATURATION: 98 % | RESPIRATION RATE: 16 BRPM | HEART RATE: 76 BPM | DIASTOLIC BLOOD PRESSURE: 66 MMHG | SYSTOLIC BLOOD PRESSURE: 122 MMHG | TEMPERATURE: 98 F

## 2025-08-25 DIAGNOSIS — D64.9 ANEMIA, UNSPECIFIED TYPE: ICD-10-CM

## 2025-08-25 DIAGNOSIS — E61.1 IRON DEFICIENCY: Primary | ICD-10-CM

## (undated) DEVICE — 40580 - THE PINK PAD - ADVANCED TRENDELENBURG POSITIONING KIT: Brand: 40580 - THE PINK PAD - ADVANCED TRENDELENBURG POSITIONING KIT

## (undated) DEVICE — ARM DRAPE

## (undated) DEVICE — LAPAROVUE VISIBILITY SYSTEM LAPAROSCOPIC SOLUTIONS: Brand: LAPAROVUE

## (undated) DEVICE — BLADELESS OBTURATOR: Brand: WECK VISTA

## (undated) DEVICE — ADHESIVE SKIN TOP FOR WND CLSR DERMBND ADV

## (undated) DEVICE — SUT PDS II 1 36IN ABSRB VLT L36MM CT-1

## (undated) DEVICE — GLOVE,SURG,SENSICARE SLT,LF,PF,7: Brand: MEDLINE

## (undated) DEVICE — ROBOTIC GENERAL: Brand: MEDLINE INDUSTRIES, INC.

## (undated) DEVICE — SUT MCRYL 4-0 18IN PS-2 ABSRB UD 19MM 3/8 CIR

## (undated) DEVICE — LARGE HEM-O-LOK CLIP APPLIER: Brand: ENDOWRIST

## (undated) DEVICE — COVER LT HNDL RIG FOR SUR CAM DISP

## (undated) DEVICE — PERMANENT CAUTERY HOOK: Brand: ENDOWRIST

## (undated) DEVICE — GLOVE SUR 7.5 SENSICARE PI PIP GRN PWD F

## (undated) DEVICE — ANCHOR TISSUE RETRIEVAL SYSTEM, BAG SIZE 125 ML, PORT SIZE 8 MM: Brand: ANCHOR TISSUE RETRIEVAL SYSTEM

## (undated) DEVICE — FENESTRATED BIPOLAR FORCEPS: Brand: ENDOWRIST

## (undated) DEVICE — ENDOPATH ULTRA VERESS INSUFFLATION NEEDLES WITH LUER LOCK CONNECTORS: Brand: ENDOPATH

## (undated) DEVICE — CLIP INT L POLYMER LOK LIG HEM O LOK

## (undated) DEVICE — CANNULA SEAL

## (undated) DEVICE — Device: Brand: SUTURE PASSOR PRO

## (undated) DEVICE — DRAPE,TOP,102X53,STERILE: Brand: MEDLINE

## (undated) DEVICE — GLOVE SUR 7 SENSICARE PI PIP CRM PWD F

## (undated) DEVICE — COLUMN DRAPE

## (undated) NOTE — LETTER
Maria E Mccain   [address] [place] [id number]           Dear Maria E Mccain     Our records indicate that you have outstanding lab work and or testing that was ordered for you and has not yet been completed:   Breast cancer screening  To provide you with the best possible care, please complete these orders at your earliest convenience. If you have recently completed these orders please disregard this letter.     If you have any questions please call the office at [phone].     Thank you,     [facility]

## (undated) NOTE — LETTER
07/08/25    Maria E Mccain  [address] [place] [id number]        Nguyễn Sanderson,      According to our records, you are overdue for an annual physical with Dr. Rodriguez.    Please call us at [phone] to get an appointment scheduled.      Thank you,        Mckayla Leal  MS, APRN, [facility]  [phone]